# Patient Record
Sex: MALE | Race: WHITE
[De-identification: names, ages, dates, MRNs, and addresses within clinical notes are randomized per-mention and may not be internally consistent; named-entity substitution may affect disease eponyms.]

---

## 2018-05-28 ENCOUNTER — HOSPITAL ENCOUNTER (EMERGENCY)
Dept: HOSPITAL 25 - UCEAST | Age: 38
Discharge: HOME | End: 2018-05-28
Payer: COMMERCIAL

## 2018-05-28 VITALS — SYSTOLIC BLOOD PRESSURE: 131 MMHG | DIASTOLIC BLOOD PRESSURE: 91 MMHG
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
## 2018-05-28 DIAGNOSIS — L08.9: ICD-10-CM

## 2018-05-28 DIAGNOSIS — B96.89: ICD-10-CM

## 2018-05-28 DIAGNOSIS — S90.822A: Primary | ICD-10-CM
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
## 2018-05-28 PROCEDURE — 87205 SMEAR GRAM STAIN: CPT

## 2018-05-28 PROCEDURE — 99202 OFFICE O/P NEW SF 15 MIN: CPT

## 2018-05-28 PROCEDURE — 87077 CULTURE AEROBIC IDENTIFY: CPT

## 2018-05-28 PROCEDURE — 87640 STAPH A DNA AMP PROBE: CPT

## 2018-05-28 PROCEDURE — 87070 CULTURE OTHR SPECIMN AEROBIC: CPT

## 2018-05-28 PROCEDURE — 87641 MR-STAPH DNA AMP PROBE: CPT

## 2018-05-28 PROCEDURE — G0463 HOSPITAL OUTPT CLINIC VISIT: HCPCS

## 2018-05-28 PROCEDURE — 87186 SC STD MICRODIL/AGAR DIL: CPT

## 2018-05-28 NOTE — UC
Skin Complaint HPI





- HPI Summary


HPI Summary: 





37 yo male presents with infected blister to left heel. He tells me that he is 

an avid runner and has old running shoes that rub on his feet. 2 days ago ran 5 

miles and yesterday noticed a small blister on his left heel. Today is larger, 

red around it, and has filled with yellow/brown colored fluid. Denies fever, 

chills.





- History of Current Complaint


Time Seen by Provider: 05/28/18 16:33


Stated Complaint: BLISTER ON FOOT


Hx Obtained From: Patient


Onset/Duration: Sudden Onset


Skin Exposure Onset/Duration: Hours Ago


Timing: Constant


Onset Severity: Mild


Current Severity: Mild


Pain Intensity: 3


Pain Scale Used: 0-10 Numeric





- Allergy/Home Medications


Allergies/Adverse Reactions: 


 Allergies











Allergy/AdvReac Type Severity Reaction Status Date / Time


 


No Known Allergies Allergy   Verified 05/28/18 16:34











Home Medications: 


 Home Medications





Fluticasone NASAL SPRAY 50MCG* [Flonase NASAL SPRAY 50MCG*] 1 spray NASAL DAILY 

PRN 05/28/18 [History Confirmed 05/28/18]


Ibuprofen TAB* [Advil TAB*] 200 mg PO Q6H PRN 05/28/18 [History Confirmed 05/28/ 18]


Multivitamin [Multivitamins] 1 cap PO DAILY 05/28/18 [History Confirmed 05/28/18

]











Review of Systems


Constitutional: Negative


Skin: Other - Blister on left heel


Respiratory: Negative


Cardiovascular: Negative


Neurovascular: Negative


Neurological: Negative


Psychological: Negative


All Other Systems Reviewed And Are Negative: Yes





PMH/Surg Hx/FS Hx/Imm Hx





- Additional Past Medical History


Additional PMH: 





None


Previously Healthy: Yes





Physical Exam





- Summary


Physical Exam Summary: 





GENERAL: NAD. WDWN. No pain distress.


SKIN: Left heel at base of achilles: 2.0cm blister with 5mm surrounding 

erythema. Mild TTP. Blister appears filled with a thick yellow/brown fluid. No 

streaking, bleeding, or active drainage.


NECK: Supple. Nontender. No lymphadenopathy. 


CHEST:  No accessory muscle use. Breathing comfortably and in no distress.


CV:  RRR. Without m/r/g.


NEURO: Alert. CN II-XII grossly intact.


PSYCH: Age appropriate behavior.





Triage Information Reviewed: Yes


Vital Signs: 





Vital Signs:











Temp Pulse Resp BP Pulse Ox


 


 97.9 F   74   16   131/91   99 


 


 05/28/18 16:29  05/28/18 16:29  05/28/18 16:29  05/28/18 16:29  05/28/18 16:29














Course/Dx





- Course


Course Of Treatment: A small inferior portion of the blister was popped and the 

fluid was taken for culture. Rx for keflex as appears to have a mild wound 

infection.





- Diagnoses


Provider Diagnoses: Infected Blister left heel





Discharge





- Sign-Out/Discharge


Documenting (check all that apply): Discharge/Admit/Transfer





- Discharge Plan


Condition: Stable


Disposition: HOME


Prescriptions: 


Cephalexin CAP* [Keflex CAP*] 500 mg PO BID #14 cap


Patient Education Materials:  Wound Infection (DC)


Referrals: 


Aaron Sepncer MD [Primary Care Provider] - 


Additional Instructions: 


If you develop a fever, shortness of breath, chest pain, new or worsening 

symptoms - please call your PCP or go to the ED.


 





Your blood pressure was high at todays visit. Please see your primary provider 

within 4 weeks for recheck and re-evaluation.








1) Keep area clean, dry, and bandaged until well healed.





- Billing Disposition and Condition


Condition: STABLE


Disposition: HOME

## 2018-05-29 NOTE — UC
- Progress Note


Progress Note: 





Pt with 2+ neutrophil


2+ gram cocci 


on keflex


await caty Arroyo 5/29/2018 1815





Discharge





- Sign-Out/Discharge


Documenting (check all that apply): Post-Discharge Follow Up





- Discharge Plan


Condition: Stable


Disposition: HOME


Prescriptions: 


Cephalexin CAP* [Keflex CAP*] 500 mg PO BID #14 cap


Patient Education Materials:  Wound Infection (DC)


Referrals: 


Aaron Spencer MD [Primary Care Provider] - 


Additional Instructions: 


If you develop a fever, shortness of breath, chest pain, new or worsening 

symptoms - please call your PCP or go to the ED.


 





Your blood pressure was high at todays visit. Please see your primary provider 

within 4 weeks for recheck and re-evaluation.








1) Keep area clean, dry, and bandaged until well healed.





- Billing Disposition and Condition


Condition: STABLE


Disposition: HOME